# Patient Record
Sex: FEMALE
[De-identification: names, ages, dates, MRNs, and addresses within clinical notes are randomized per-mention and may not be internally consistent; named-entity substitution may affect disease eponyms.]

---

## 2020-09-20 ENCOUNTER — HOSPITAL ENCOUNTER (INPATIENT)
Dept: HOSPITAL 88 - ER | Age: 35
LOS: 1 days | Discharge: HOME | DRG: 309 | End: 2020-09-21
Attending: INTERNAL MEDICINE | Admitting: INTERNAL MEDICINE
Payer: SELF-PAY

## 2020-09-20 VITALS — BODY MASS INDEX: 32.1 KG/M2 | WEIGHT: 170 LBS | HEIGHT: 61 IN

## 2020-09-20 DIAGNOSIS — E05.90: ICD-10-CM

## 2020-09-20 DIAGNOSIS — E66.9: ICD-10-CM

## 2020-09-20 DIAGNOSIS — I48.19: Primary | ICD-10-CM

## 2020-09-20 DIAGNOSIS — B95.1: ICD-10-CM

## 2020-09-20 DIAGNOSIS — E87.6: ICD-10-CM

## 2020-09-20 DIAGNOSIS — Z11.59: ICD-10-CM

## 2020-09-20 DIAGNOSIS — N39.0: ICD-10-CM

## 2020-09-20 DIAGNOSIS — G47.00: ICD-10-CM

## 2020-09-20 DIAGNOSIS — B96.6: ICD-10-CM

## 2020-09-20 DIAGNOSIS — M51.36: ICD-10-CM

## 2020-09-20 LAB
ALBUMIN SERPL-MCNC: 4.9 G/DL (ref 3.5–5)
ALBUMIN/GLOB SERPL: 1.5 {RATIO} (ref 0.8–2)
ALP SERPL-CCNC: 79 IU/L (ref 40–150)
ALT SERPL-CCNC: 21 IU/L (ref 0–55)
AMPHETAMINES UR QL SCN>1000 NG/ML: NEGATIVE
ANION GAP SERPL CALC-SCNC: 18.6 MMOL/L (ref 8–16)
BACTERIA URNS QL MICRO: (no result) /HPF
BASOPHILS # BLD AUTO: 0.1 10*3/UL (ref 0–0.1)
BASOPHILS NFR BLD AUTO: 0.8 % (ref 0–1)
BENZODIAZ UR QL SCN: NEGATIVE
BILIRUB UR QL: (no result)
BUN SERPL-MCNC: 8 MG/DL (ref 7–26)
BUN/CREAT SERPL: 11 (ref 6–25)
CALCIUM SERPL-MCNC: 9.6 MG/DL (ref 8.4–10.2)
CHLORIDE SERPL-SCNC: 110 MMOL/L (ref 98–107)
CK MB SERPL-MCNC: 2.5 NG/ML (ref 0–5)
CK MB SERPL-MCNC: 2.9 NG/ML (ref 0–5)
CK SERPL-CCNC: 89 IU/L (ref 29–168)
CK SERPL-CCNC: 98 IU/L (ref 29–168)
CLARITY UR: (no result)
CO2 SERPL-SCNC: 20 MMOL/L (ref 22–29)
COLOR UR: YELLOW
DEPRECATED APTT PLAS QN: 28.2 SECONDS (ref 23.8–35.5)
DEPRECATED INR PLAS: 0.91
DEPRECATED NEUTROPHILS # BLD AUTO: 10.3 10*3/UL (ref 2.1–6.9)
DEPRECATED RBC URNS MANUAL-ACNC: (no result) /HPF (ref 0–5)
EGFRCR SERPLBLD CKD-EPI 2021: > 60 ML/MIN (ref 60–?)
EOSINOPHIL # BLD AUTO: 0.1 10*3/UL (ref 0–0.4)
EOSINOPHIL NFR BLD AUTO: 0.4 % (ref 0–6)
EPI CELLS URNS QL MICRO: (no result) /LPF
ERYTHROCYTE [DISTWIDTH] IN CORD BLOOD: 13.2 % (ref 11.7–14.4)
GLOBULIN PLAS-MCNC: 3.3 G/DL (ref 2.3–3.5)
GLUCOSE SERPLBLD-MCNC: 124 MG/DL (ref 74–118)
HCT VFR BLD AUTO: 43.4 % (ref 34.2–44.1)
HGB BLD-MCNC: 14.6 G/DL (ref 12–16)
KETONES UR QL STRIP.AUTO: (no result)
LEUKOCYTE ESTERASE UR QL STRIP.AUTO: NEGATIVE
LIPASE SERPL-CCNC: 24 U/L (ref 8–78)
LYMPHOCYTES # BLD: 1.8 10*3/UL (ref 1–3.2)
LYMPHOCYTES NFR BLD AUTO: 13.7 % (ref 18–39.1)
MAGNESIUM SERPL-MCNC: 1.8 MG/DL (ref 1.3–2.1)
MCH RBC QN AUTO: 29.4 PG (ref 28–32)
MCHC RBC AUTO-ENTMCNC: 33.6 G/DL (ref 31–35)
MCV RBC AUTO: 87.3 FL (ref 81–99)
MONOCYTES # BLD AUTO: 0.5 10*3/UL (ref 0.2–0.8)
MONOCYTES NFR BLD AUTO: 3.9 % (ref 4.4–11.3)
NEUTS SEG NFR BLD AUTO: 80.7 % (ref 38.7–80)
NITRITE UR QL STRIP.AUTO: NEGATIVE
PCP UR QL SCN: NEGATIVE
PLATELET # BLD AUTO: 555 X10E3/UL (ref 140–360)
POTASSIUM SERPL-SCNC: 3.6 MMOL/L (ref 3.5–5.1)
PROT UR QL STRIP.AUTO: (no result)
PROTHROMBIN TIME: 12.7 SECONDS (ref 11.9–14.5)
RBC # BLD AUTO: 4.97 X10E6/UL (ref 3.6–5.1)
SODIUM SERPL-SCNC: 145 MMOL/L (ref 136–145)
SP GR UR STRIP: 1.02 (ref 1.01–1.02)
TSH SERPL DL<=0.005 MIU/L-ACNC: 0.34 UIU/ML (ref 0.35–4.94)
UROBILINOGEN UR STRIP-MCNC: 0.2 MG/DL (ref 0.2–1)
WBC #/AREA URNS HPF: (no result) /HPF (ref 0–5)

## 2020-09-20 PROCEDURE — 83880 ASSAY OF NATRIURETIC PEPTIDE: CPT

## 2020-09-20 PROCEDURE — 85025 COMPLETE CBC W/AUTO DIFF WBC: CPT

## 2020-09-20 PROCEDURE — 93005 ELECTROCARDIOGRAM TRACING: CPT

## 2020-09-20 PROCEDURE — 85730 THROMBOPLASTIN TIME PARTIAL: CPT

## 2020-09-20 PROCEDURE — 83735 ASSAY OF MAGNESIUM: CPT

## 2020-09-20 PROCEDURE — 84702 CHORIONIC GONADOTROPIN TEST: CPT

## 2020-09-20 PROCEDURE — 99284 EMERGENCY DEPT VISIT MOD MDM: CPT

## 2020-09-20 PROCEDURE — 84484 ASSAY OF TROPONIN QUANT: CPT

## 2020-09-20 PROCEDURE — 80061 LIPID PANEL: CPT

## 2020-09-20 PROCEDURE — 85610 PROTHROMBIN TIME: CPT

## 2020-09-20 PROCEDURE — 84436 ASSAY OF TOTAL THYROXINE: CPT

## 2020-09-20 PROCEDURE — 36415 COLL VENOUS BLD VENIPUNCTURE: CPT

## 2020-09-20 PROCEDURE — 85379 FIBRIN DEGRADATION QUANT: CPT

## 2020-09-20 PROCEDURE — 83036 HEMOGLOBIN GLYCOSYLATED A1C: CPT

## 2020-09-20 PROCEDURE — 82553 CREATINE MB FRACTION: CPT

## 2020-09-20 PROCEDURE — 93306 TTE W/DOPPLER COMPLETE: CPT

## 2020-09-20 PROCEDURE — 82550 ASSAY OF CK (CPK): CPT

## 2020-09-20 PROCEDURE — 83690 ASSAY OF LIPASE: CPT

## 2020-09-20 PROCEDURE — 87086 URINE CULTURE/COLONY COUNT: CPT

## 2020-09-20 PROCEDURE — 81001 URINALYSIS AUTO W/SCOPE: CPT

## 2020-09-20 PROCEDURE — 84443 ASSAY THYROID STIM HORMONE: CPT

## 2020-09-20 PROCEDURE — 80307 DRUG TEST PRSMV CHEM ANLYZR: CPT

## 2020-09-20 PROCEDURE — 84479 ASSAY OF THYROID (T3 OR T4): CPT

## 2020-09-20 PROCEDURE — 71045 X-RAY EXAM CHEST 1 VIEW: CPT

## 2020-09-20 PROCEDURE — 80053 COMPREHEN METABOLIC PANEL: CPT

## 2020-09-20 RX ADMIN — FAMOTIDINE SCH MG: 10 INJECTION, SOLUTION INTRAVENOUS at 14:27

## 2020-09-20 RX ADMIN — SODIUM CHLORIDE SCH MLS/HR: 9 INJECTION, SOLUTION INTRAVENOUS at 13:45

## 2020-09-20 NOTE — EMERGENCY DEPARTMENT NOTE
History of Present Illnes


History of Present Illness


Chief Complaint:  Abdominal Complaints


History of Present Illness


This is a 34 year old  female PT C/O ABDOMINAL PAIN X 1 YEAR, PT STATES 

THAT SHE HAD A "FEW DRINKS" LAST NIGHT AND IS NOW EXPERIENCING DIARRHEA, 

VOMITING, NAUSEA AND PALPITATIONS, PT STATES THAT THIS BEEN ONGOING FOR YEARS 

NOW BUT EXACERBATED LAST NIGHT, LBM YESTERDAY, INITIAL HEART RATE 173, PT IS 

AAOX4 DURING INITIAL TRIAGE.


Historian:  Patient


Arrival Mode:  Car


 Required:  No


Onset (how long ago):  hour(s)


Radiation:  Reports non-radiation


Severity:  severe


Onset quality:  sudden


Timing of current episode:  constant


Chronicity:  new


Context:  Denies recent illness


Relieving factors:  none


Exacerbating factors:  none


Associated symptoms:  Reports chest pain, Reports shortness of breath





Past Medical/Family History


Physician Review


I have reviewed the patient's past medical and family history.  Any updates have

been documented here.





Past Medical History


Recent Fever:  No


Clinical Suspicion of Infectio:  No


New/Unexplained Change in Ment:  No


Other Medical History:  


DEGENERATIVE DISC DISEASE


Other Surgery:  


ECTOPIC PREG 6YRS AGO





Social History


Smoking Cessation:  Current some day smoker


Counseling Performed:  Yes


Alcohol Use:  Social


Any Illegal Drug Use:  Yes (Marijuana)


TB Exposure/Symptoms:  No


Physically hurt or threatened:  No





Family History


Family history of heart diseas:  No





Other


Last Tetanus:  UNK


Any Pre-Existing Lines (PICC,:  No





Review of Systems


Review of Systems


Constitutional:  Reports no symptoms


EENTM:  Reports no symptoms


Cardiovascular:  Reports as per HPI, Reports chest pain, Reports palpitations


Respiratory:  Reports no symptoms


Gastrointestinal:  Reports as per HPI, Reports abdominal pain, Reports nausea, 

Reports vomiting


Genitourinary:  Reports no symptoms


Musculoskeletal:  Reports no symptoms


Integumentary:  Reports no symptoms


Neurological:  Reports no symptoms


Psychological:  Reports no symptoms


Endocrine:  Reports no symptoms


Hematological/Lymphatic:  Reports no symptoms





Physical Exam


Related Data


Allergies:  


Coded Allergies:  


     No Known Allergies (Unverified , 1/1/12)


Triage Vital Signs





Vital Signs








  Date Time  Temp Pulse Resp B/P (MAP) Pulse Ox O2 Delivery O2 Flow Rate FiO2


 


9/20/20 11:45  175 18 134/85 100   


 


9/20/20 12:04      Room Air  


 


9/20/20 12:20 98.6       








Vital signs reviewed:  Yes





Physical Exam


CONSTITUTIONAL





Constitutional:  Present well-developed, Present well-nourished


HENT


HENT:  Present normocephalic, Present atraumatic, Present oropharynx 

clear/moist, Present nose normal


HENT L/R:  Present left ext ear normal, Present right ext ear normal


EYES





Eyes:  Reports PERRL, Reports conjunctivae normal


NECK


Neck:  Present ROM normal


PULMONARY


Pulmonary:  Present effort normal, Present breath sounds normal


CARDIOVASCULAR





Cardiovascular:  Present irregular rhythm, Present capillary refill normal, 

Present tachycardia; 


   Absent LLE edema, Absent RLE edema


GASTROINTESTINAL





Abdominal:  Present soft, Present nontender, Present bowel sounds normal


GENITOURINARY





Genitourinary:  Present exam deferred


SKIN


Skin:  Present warm, Present dry


MUSCULOSKELETAL





Musculoskeletal:  Present ROM normal


NEUROLOGICAL





Neurological:  Present alert, Present oriented x 3, Present no gross motor or 

sensory deficits


PSYCHOLOGICAL


Psychological:  Present mood/affect normal, Present judgement normal





Results


Laboratory


Result Diagram:  


9/20/20 1155                                                                    

           9/20/20 1155





Laboratory





Laboratory Tests








Test


 9/20/20


11:55


 


White Blood Count


 12.75 x10e3/uL


(4.8-10.8)


 


Red Blood Count


 4.97 x10e6/uL


(3.6-5.1)


 


Hemoglobin


 14.6 g/dL


(12.0-16.0)


 


Hematocrit


 43.4 %


(34.2-44.1)


 


Mean Corpuscular Volume


 87.3 fL


(81-99)


 


Mean Corpuscular Hemoglobin


 29.4 pg


(28-32)


 


Mean Corpuscular Hemoglobin


Concent 33.6 g/dL


(31-35)


 


Red Cell Distribution Width


 13.2 %


(11.7-14.4)


 


Platelet Count


 555 x10e3/uL


(140-360)


 


Neutrophils (%) (Auto)


 80.7 %


(38.7-80.0)


 


Lymphocytes (%) (Auto)


 13.7 %


(18.0-39.1)


 


Monocytes (%) (Auto)


 3.9  %


(4.4-11.3)


 


Eosinophils (%) (Auto)


 0.4 %


(0.0-6.0)


 


Basophils (%) (Auto)


 0.8 %


(0.0-1.0)


 


Neutrophils # (Auto) 10.3 (2.1-6.9) 


 


Lymphocytes # (Auto) 1.8 (1.0-3.2) 


 


Monocytes # (Auto) 0.5 (0.2-0.8) 


 


Eosinophils # (Auto) 0.1 (0.0-0.4) 


 


Basophils # (Auto) 0.1 (0.0-0.1) 


 


Absolute Immature Granulocyte


(auto 0.07 x10e3/uL


(0-0.1)


 


Prothrombin Time


 12.7 seconds


(11.9-14.5)


 


Prothromb Time International


Ratio 0.91 





 


Activated Partial


Thromboplast Time 28.2 seconds


(23.8-35.5)


 


D-Dimer Quantitative (PE/DVT)


 0.39 ug/mLFEU


(0.00-0.45)


 


Urine Color


 Yellow


(YELLOW)


 


Urine Clarity


 Sl cloudy


(CLEAR)


 


Urine pH 7.5 (5 - 7) 


 


Urine Specific Gravity


 1.020


(1.010-1.025)


 


Urine Protein 2+ (NEGATIVE) 


 


Urine Glucose (UA)


 Negative


(NEGATIVE)


 


Urine Ketones


 Trace


(NEGATIVE)


 


Urine Blood


 Small


(NEGATIVE)


 


Urine Nitrite


 Negative


(NEGATIVE)


 


Urine Bilirubin


 Small


(NEGATIVE)


 


Urine Urobilinogen


 0.2 mg/dL (0.2


- 1)


 


Urine Leukocyte Esterase


 Negative


(NEGATIVE)


 


Urine RBC


 6-10 /HPF


(0-5)


 


Urine WBC


 6-10 /HPF


(0-5)


 


Urine Epithelial Cells


 Few /LPF


(NONE)


 


Urine Bacteria


 Rare /HPF


(NONE)


 


Sodium Level


 145 mmol/L


(136-145)


 


Potassium Level


 3.6 mmol/L


(3.5-5.1)


 


Chloride Level


 110 mmol/L


()


 


Carbon Dioxide Level


 20 mmol/L


(22-29)


 


Anion Gap


 18.6 mmol/L


(8-16)


 


Blood Urea Nitrogen 8 mg/dL (7-26) 


 


Creatinine


 0.72 mg/dL


(0.57-1.11)


 


Estimat Glomerular Filtration


Rate > 60 ML/MIN


(60-)


 


BUN/Creatinine Ratio 11 (6-25) 


 


Glucose Level


 124 mg/dL


()


 


Calcium Level


 9.6 mg/dL


(8.4-10.2)


 


Magnesium Level


 1.8 MG/DL


(1.3-2.1)


 


Total Bilirubin


 0.4 mg/dL


(0.2-1.2)


 


Aspartate Amino Transf


(AST/SGOT) 15 IU/L (5-34) 





 


Alanine Aminotransferase


(ALT/SGPT) 21 IU/L (0-55) 





 


Alkaline Phosphatase


 79 IU/L


()


 


Creatine Kinase


 98 IU/L


()


 


Creatine Kinase MB


 2.90 ng/mL


(0-5.0)


 


Troponin I


 0.004 ng/mL


(0-0.300)


 


B-Type Natriuretic Peptide


 10.8 pg/mL


(0-100)


 


Total Protein


 8.2 g/dL


(6.5-8.1)


 


Albumin


 4.9 g/dL


(3.5-5.0)


 


Globulin


 3.3 g/dL


(2.3-3.5)


 


Albumin/Globulin Ratio 1.5 (0.8-2.0) 


 


Lipase 24 U/L (8-78) 


 


Thyroid Stimulating Hormone


(TSH) 0.337 uIU/mL


(0.350-4.940)


 


Human Chorionic Gonadotropin,


Qual Negative


(NEGATIVE)








Lab results reviewed:  Yes





Imaging


Imaging results reviewed:  Yes





Procedures


12 Lead ECG Interpretation


ECG Interpretation :  


   ECG:  ECG 1


   :  Interpreted by ED physician


   Date:  Sep 20, 2020


   Time:  11:58


   Rhythm:  atrial fibrillation (WITH RVR)


   Rate:  tachycardia (188)


   QRS axis:  normal


   ST segments normal:  Yes


   T waves normal:  Yes


   Clinical Impression:  abnormal ECG





Critical Care Time


Total Critical Care Time (min):  45


Critical care time exclusive o:  separately billable procedures


Critcal care time spent by me:  discussion w primary provider, evaluation 

patient response to tx, order/perform tx or interventions, order/review 

laboratory studies, order/review radiographic studies, pulse oximetry, re-evalua

tion of patient condition





Assessment & Plan


Medical Decision Making


MDM


NEW ONSET AFIB RVR - CBC, CHEM, ECG, CARDIACS, TSH, UDS - EVAL FOR STEMI/NSTEMI,

HYPERTHYROID, ELECTROLYTE ABNL, RENAL INSUFF, HOLIDAY HEART SYNDROME, DRUG 

INGESTION





Reassessment


Reassessment


DILTIAZEM IV GIVEN, MILD DECR OF HR, RATE CONTROLLED MUCH BETTER AFTER 

METOPROLOL IV, GIVEN LOVENOX. D/W DR MORENO FOR ADMISSION





Assessment & Plan


Final Impression:  


(1) New onset atrial fibrillation


(2) Atrial fibrillation with RVR


Depart Disposition:  ADMITTED


Last Vital Signs











  Date Time  Temp Pulse Resp B/P (MAP) Pulse Ox O2 Delivery O2 Flow Rate FiO2


 


9/20/20 13:01  141  102/82    


 


9/20/20 12:53   21  100 Room Air  


 


9/20/20 12:20 98.6       








Home Meds


No Active Prescriptions or Reported Meds


Medications in the ED





Diltiazem HCl 5 ml @ ud STK-MED ONCE .ROUTE ;  Start 9/20/20 at 12:13;  Stop 

9/20/20 at 12:07;  Status DC


Sodium Chloride 1,000 ml @  ud STK-MED ONCE .ROUTE ;  Start 9/20/20 at 12:14;  

Stop 9/20/20 at 12:07;  Status DC


Ondansetron HCl 4 mg ONCE IV  Last administered on 9/20/20at 12:35; Admin Dose 4

MG;  Start 9/20/20 at 12:15;  Stop 9/20/20 at 13:59


Sodium Chloride 1,000 ml @  0 mls/hr Q0M STAT IV  Last administered on 9/20/20at

12:19; Admin Dose 999 MLS/HR;  Start 9/20/20 at 12:07;  Stop 9/20/20 at 12:10;  

Status DC


Aspirin 81 mg PRN  ONCE PO  Last administered on 9/20/20at 12:30; Admin Dose 81 

MG;  Start 9/20/20 at 12:15;  Stop 9/20/20 at 12:16;  Status DC


Diltiazem HCl 15 mg NOW IV  Last administered on 9/20/20at 12:19; Admin Dose 15 

MG;  Start 9/20/20 at 12:10;  Stop 9/20/20 at 13:00;  Status DC


Enoxaparin Sodium 80 mg NOW SC  Last administered on 9/20/20at 12:30; Admin Dose

80 MG;  Start 9/20/20 at 12:15;  Stop 9/20/20 at 13:59


Metoprolol Tartrate 5 mg STK-MED ONCE .ROUTE ;  Start 9/20/20 at 12:56;  Stop 

9/20/20 at 12:49;  Status DC


Metoprolol Tartrate 5 mg ONCE  ONCE IV  Last administered on 9/20/20at 13:01; 

Admin Dose 5 MG;  Start 9/20/20 at 13:00;  Stop 9/20/20 at 13:02;  Status DC


Sodium Chloride 1,000 ml @  0 mls/hr Q0M STAT IV ;  Start 9/20/20 at 13:20;  

Stop 9/20/20 at 13:22;  Status DC


Ceftriaxone Sodium 50 ml @  100 mls/hr ONCE  ONCE IV ;  Start 9/20/20 at 13:30; 

Stop 9/20/20 at 13:59


Metoprolol Tartrate 25 mg ONCE PO ;  Start 9/20/20 at 13:30;  Stop 9/20/20 at 

14:59


Metoprolol Tartrate 5 mg ONCE IV ;  Start 9/20/20 at 13:45;  Stop 9/20/20 at 

14:59


Digoxin 0.25 mg ONCE  ONCE IV ;  Start 9/20/20 at 13:45;  Stop 9/20/20 at 13:46;

 Status UNV


Famotidine 20 mg Q12H IV ;  Start 9/20/20 at 13:45;  Stop 10/20/20 at 13:44;  

Status UNV


Ondansetron HCl 4 mg Q4H  PRN IV NAUSEA AND VOMITING;  Start 9/20/20 at 13:45;  

Stop 10/20/20 at 13:44;  Status UNV


Sodium Chloride 1,000 ml @  100 mls/hr Q10H IV ;  Start 9/20/20 at 13:45;  Stop 

10/20/20 at 13:44;  Status UNV











JOLENE RODRIGUEZ MD               Sep 20, 2020 13:54

## 2020-09-20 NOTE — DIAGNOSTIC IMAGING REPORT
EXAMINATION:  CHEST SINGLE (PORTABLE)    



INDICATION:      TACHYCARDIA





COMPARISON: None

     

FINDINGS:  AP view   



TUBES and LINES:  None.        .    



LUNGS/PLEURA:  Lungs are well inflated.  There is no evidence of pneumonia or

pulmonary edema.. There is no pleural effusion or pneumothorax.



HEART AND MEDIASTINUM:  The cardiomediastinal silhouette is unremarkable.    



BONES AND SOFT TISSUES:  No acute osseous lesion.  Soft tissues are

unremarkable.



UPPER ABDOMEN: No free air under the diaphragm.    



IMPRESSION: 

No acute thoracic abnormality.





Signed by: Jose Reeder MD on 9/20/2020 12:52 PM

## 2020-09-20 NOTE — CONSULTATION
DATE OF CONSULTATION:  

 

Pulmonary Critical Care Consultation 

 

CHIEF COMPLAINT:  Tachycardia and palpitations.

 

HISTORY OF PRESENT ILLNESS:  The patient is a 34-year-old woman.  She reports a history

of intermittent tachycardia for many use.  Today, she noticed more pronounced

tachycardia and palpitations.  She had some discomfort in her chest.  There was no

fever.  She had no nausea or vomiting. 

 

PAST MEDICAL HISTORY:  

1. No prior history of thyroid disease.

2. No prior history of heart disease.

 

PAST SURGICAL HISTORY:  Noncontributory.

 

ALLERGIES:  NO KNOWN DRUG ALLERGIES.

 

FAMILY HISTORY:  Noncontributory.

 

SOCIAL HISTORY:  The patient is an occasional drinker and had two drinks last night.

She is not an active smoker. 

 

REVIEW OF SYSTEMS:

There is no fever.  No headache.  She has no cough.  She does have some palpitations.

She has some chest discomfort.  She has no dyspnea.  She has no nausea or vomiting.  She

has no leg edema. 

 

PHYSICAL EXAMINATION:

VITAL SIGNS:  Blood pressure is 117/65 and saturation is 100%.  She is on 2 L.  Her

heart rate is 130 to 140. 

HEENT:  Shows no facial swelling or erythema. 

LYMPHATIC:  Shows no submandibular, cervical, or supraclavicular adenopathy. 

CARDIAC:  Reveals irregularly irregular rhythm with normal S1 and S2. 

LUNGS:  Auscultation of lungs reveals decreased breath sounds at the bases.  There is no

wheezing. 

ABDOMEN:  Soft and nontender.  There is no rebound or guarding. 

EXTREMITIES:  Shows no leg edema or calf tenderness.

LABORATORY DATA:  White blood cell count is 12.7, hemoglobin is 14.6, and platelet count

555.  The BUN-to-creatinine ratio is normal.  The carbon dioxide is 20 and the other

electrolytes are within normal limits.  Liver function tests are normal. 

 

RADIOGRAPHIC DATA:  Chest x-ray shows no acute abnormalities.

 

IMPRESSION:  Atrial fibrillation with rapid ventricular response.

 

PLAN:  

1. The patient has received metoprolol and will be started on amiodarone drip.

2. Cardiology consultation.

3. Echocardiogram.

4. Thyroid studies.

 

 

 

 

______________________________

Han Pearl MD LMH/MODL

D:  09/20/2020 17:33:39

T:  09/20/2020 22:29:04

Job #:  [id number]

## 2020-09-21 VITALS — DIASTOLIC BLOOD PRESSURE: 67 MMHG | SYSTOLIC BLOOD PRESSURE: 108 MMHG

## 2020-09-21 LAB
ALBUMIN SERPL-MCNC: 3.9 G/DL (ref 3.5–5)
ALBUMIN/GLOB SERPL: 1.6 {RATIO} (ref 0.8–2)
ALP SERPL-CCNC: 56 IU/L (ref 40–150)
ALT SERPL-CCNC: 16 IU/L (ref 0–55)
ANION GAP SERPL CALC-SCNC: 11.3 MMOL/L (ref 8–16)
BASOPHILS # BLD AUTO: 0.1 10*3/UL (ref 0–0.1)
BASOPHILS NFR BLD AUTO: 0.6 % (ref 0–1)
BUN SERPL-MCNC: 8 MG/DL (ref 7–26)
BUN/CREAT SERPL: 12 (ref 6–25)
CALCIUM SERPL-MCNC: 8.1 MG/DL (ref 8.4–10.2)
CHLORIDE SERPL-SCNC: 110 MMOL/L (ref 98–107)
CHOLEST SERPL-MCNC: 145 MD/DL (ref 0–199)
CHOLEST/HDLC SERPL: 5 {RATIO} (ref 3–3.6)
CK MB SERPL-MCNC: 1.7 NG/ML (ref 0–5)
CK MB SERPL-MCNC: 2.4 NG/ML (ref 0–5)
CK SERPL-CCNC: 100 IU/L (ref 29–168)
CK SERPL-CCNC: 95 IU/L (ref 29–168)
CO2 SERPL-SCNC: 22 MMOL/L (ref 22–29)
DEPRECATED FTI SERPL-MCNC: 1.89 UG/DL (ref 1.4–3.8)
DEPRECATED NEUTROPHILS # BLD AUTO: 6 10*3/UL (ref 2.1–6.9)
EGFRCR SERPLBLD CKD-EPI 2021: > 60 ML/MIN (ref 60–?)
EOSINOPHIL # BLD AUTO: 0.1 10*3/UL (ref 0–0.4)
EOSINOPHIL NFR BLD AUTO: 1.1 % (ref 0–6)
ERYTHROCYTE [DISTWIDTH] IN CORD BLOOD: 13.3 % (ref 11.7–14.4)
GLOBULIN PLAS-MCNC: 2.4 G/DL (ref 2.3–3.5)
GLUCOSE SERPLBLD-MCNC: 94 MG/DL (ref 74–118)
HCT VFR BLD AUTO: 34.3 % (ref 34.2–44.1)
HDLC SERPL-MSCNC: 29 MG/DL (ref 40–60)
HGB BLD-MCNC: 11.3 G/DL (ref 12–16)
LDLC SERPL CALC-MCNC: 38 MG/DL (ref 60–130)
LIPASE SERPL-CCNC: 21 U/L (ref 8–78)
LYMPHOCYTES # BLD: 4.3 10*3/UL (ref 1–3.2)
LYMPHOCYTES NFR BLD AUTO: 37.6 % (ref 18–39.1)
MCH RBC QN AUTO: 29.3 PG (ref 28–32)
MCHC RBC AUTO-ENTMCNC: 32.9 G/DL (ref 31–35)
MCV RBC AUTO: 88.9 FL (ref 81–99)
MONOCYTES # BLD AUTO: 0.9 10*3/UL (ref 0.2–0.8)
MONOCYTES NFR BLD AUTO: 7.6 % (ref 4.4–11.3)
NEUTS SEG NFR BLD AUTO: 52.7 % (ref 38.7–80)
PLATELET # BLD AUTO: 382 X10E3/UL (ref 140–360)
POTASSIUM SERPL-SCNC: 3.3 MMOL/L (ref 3.5–5.1)
RBC # BLD AUTO: 3.86 X10E6/UL (ref 3.6–5.1)
SODIUM SERPL-SCNC: 140 MMOL/L (ref 136–145)
TRIGL SERPL-MCNC: 391 MG/DL (ref 0–149)
TSH SERPL DL<=0.005 MIU/L-ACNC: 0.44 UIU/ML (ref 0.35–4.94)
TSH SERPL DL<=0.005 MIU/L-ACNC: 0.85 UIU/ML (ref 0.35–4.94)

## 2020-09-21 RX ADMIN — FAMOTIDINE SCH MG: 10 INJECTION, SOLUTION INTRAVENOUS at 02:44

## 2020-09-21 RX ADMIN — SODIUM CHLORIDE SCH MLS/HR: 9 INJECTION, SOLUTION INTRAVENOUS at 00:04

## 2020-09-21 RX ADMIN — SODIUM CHLORIDE SCH MLS/HR: 9 INJECTION, SOLUTION INTRAVENOUS at 09:54

## 2020-09-21 NOTE — NUR
called and spoke to kitchen at [id number] regarding pt complaint of no food for 
lunch, dinner or breakfast. pt very upset. tray coming immediately and updated 
pt.

## 2020-09-21 NOTE — NUR
PT RESTING COMFORTABLY AT THIS TIME. NAD NOTED. BED IS LOCKED AND IN LOWEST 
POSITION. CALL LIGHT IS IN REACH IF IN NEED FOR ASSISTANCE.

## 2020-09-21 NOTE — HISTORY AND PHYSICAL
CONSULTING PHYSICIANS:  Included Dr. Han Pearl with Pulmonology and Dr. Basil Beltran

with Cardiology. 

 

PRIMARY CARE PHYSICIAN:  None.

 

CHIEF COMPLAINT:  Heart racing, nausea/vomiting, unable to keep food down.

 

HISTORY OF PRESENT ILLNESS:  The patient is a 34-year-old female, who was admitted via

the emergency department with atrial fibrillation with RVR as evidenced on 12-lead EKG,

which started on  morning.  The patient feels as though she has been having signs

and symptoms of this when she was younger.  The patient states recently she has been

drinking [employer] energy drinks at work and having increased stress.  On Saturday

morning, she had alcoholic drinks with her family.  She is currently seen in the ER room

#4 with her boyfriend at the bedside. 

 

PAST MEDICAL HISTORY:  Lower back degenerative disk disease, which was diagnosed at an

Urgent [facility].  She states she has been tested for diabetes in the past, which was

negative.  Positive for history of kidney stones.  She has taken Ambien at home for

insomnia at times until her insurance ran out. 

 

PAST SURGICAL HISTORY:  Two ectopic pregnancies with removal of fallopian tubes.

 

FAMILY HISTORY:  Mother had diabetes, hypertension, dementia, and bipolar depression.

Father had diabetes mellitus, hypertension, MI x2, and he  from a stroke. 

 

SOCIAL HISTORY:  The patient denies previous use of tobacco.  She used to be a heavy

alcohol drinker, still drinks alcohol on occasion.  Illicit drugs, admits to smoking

marijuana, but no heavy drugs. 

 

ALLERGIES:  NO KNOWN ALLERGIES.

 

HOME MEDICATIONS:  Ibuprofen, Ambien at times.

 

REVIEW OF SYSTEMS:

A 14-point review of systems was completed. 

GENERAL:  Denies fever.  Denies chills.  Denies significant weight loss or weight gain. 

EYES:  Wears glasses.  She states she has had blurry vision at times. 

GENITOURINARY:  Denies dysuria.  No complaints of shortness of breath, cough, or phlegm. 

INTEGUMENTARY:  No skin problems. 

CARDIOVASCULAR:  Palpitations, which have improved.  No complaints of chest pain. 

GASTROINTESTINAL:  Mild diarrhea today, which is improving.  She did have nausea this

morning, this improved.  No complaints of vomiting currently. 

MUSCULOSKELETAL:  No complaints of joint or muscle pain. 

NEUROLOGIC:  Sharp headache at times, but none now.  Dizzy off and on if she skips

meals. 

ENDOCRINE:  Denies history of diabetes.

 

PHYSICAL EXAMINATION:

VITAL SIGNS:  Temperature 98.2, pulse 72, blood pressure 108/67, respirations 16, oxygen

saturation 99%.  Height 5 feet 1 inch, weight 170 pounds, BMI 32.11. 

GENERAL:  Sitting on the edge of the gurney. 

LUNGS:  Clear to auscultation.  Respiratory pattern even and unlabored.  No supplemental

oxygen. 

HEENT:  EOMI. 

NECK:  Supple. 

CARDIOVASCULAR:  Regular rate and rhythm.  No murmur. 

ABDOMEN:  Bowel sounds positive.  Soft, nontender, obese. 

EXTREMITIES:  No pitting edema.  No clubbing, cyanosis, or signs of DVT. 

NEUROLOGICAL:  GCS 15.  Nonfocal.

LABORATORY DATA:  On admission; WBCs 12.75, now 11.37; yesterday hemoglobin 14.6,

hematocrit 43.4, and platelets 555, now 382.  PT 12.7, INR 0.91, PTT 28.2.  D-dimer

0.39.  Sodium 145; potassium 3.6; chloride 110; CO2 of 20; anion gap 18.6, she is now

11.3, BUN 8; creatinine 0.72; estimated GFR greater than 60; glucose 124; hemoglobin A1c

5.2%; calcium 9.6; magnesium 1.8; total bilirubin 0.4; AST 15; ALT 21; alkaline

phosphatase 79.  Creatine kinase 98, then 89, then 95; CK-MB 2.9, then 2.5, then 2.4;

troponin I 0.004, then 0.012, then 0.009.  B-type natriuretic peptide 10.8.  Total

protein 8.2, albumin 4.9, lipase 24, TSH 0.337.  HCG, qualitative negative.  TSH 0.440,

then 0.855; free T4 index 1.877; thyroxine (T4) 6.22; T3 uptake 30.35.  Urinalysis

negative for nitrites or leukocyte esterase, rbc 6-10, wbc 6-10, bacteria rare, nitrite

negative, ketones trace, 2+ protein, slightly cloudy clarity.  Coronavirus PCR is still

pending.  Urine culture, contaminated as preliminary results show Streptococcus

agalactiae group B.  chest x-ray shows no acute thoracic abnormality.  Initial ECG

showed atrial fibrillation with rapid ventricular rate, heart rate of 188.  Now, ECG

shows normal sinus rhythm with a heart rate of 80.  Preliminary echo showed ejection

fraction of 55% to 60%.  Urine drug screen positive for cannabinoids. 

 

ASSESSMENT AND PLAN:  

1. Atrial fibrillation with rapid ventricular rate, now heart rate controlled.  The

patient was seen by Cardiology.  We will send her home on metoprolol 25 mg p.o. b.i.d.

and aspirin 81 mg daily.  Heart rate now below 100 mg.  Amiodarone drip off around 12:30

this afternoon.  Cardiac enzymes negative x3 sets. 

2. Acute urinary tract infection with Streptococcus agalactiae group B.  case discussed

with Dr. Sparks.  He states that urinalysis contaminated.  We will not treat. 

3. Acute hypokalemia.  Potassium level 3.3 (3.6).  We will give 20 mEq potassium

chloride p.o. once.  The patient to establish and follow up with the PCP in 1 to 2

weeks.  Recheck potassium at that time. 

4. Obesity with BMI of 32.11.  Dietary restrictions.

5. Possible hyperthyroidism.  Thyroid levels within normal limits.

6. Prophylaxis, Pepcid, ambulatory. 

Inpatient billing code [id number].  Time spent 60 minutes.

 

 

Dictated by Rodriguez Xavier NP

 

______________________________

MD JOSE SolorioP/MODL

D:  2020 16:23:58

T:  2020 17:54:18

Job #:  [id number]